# Patient Record
Sex: FEMALE | Race: WHITE | NOT HISPANIC OR LATINO | URBAN - METROPOLITAN AREA
[De-identification: names, ages, dates, MRNs, and addresses within clinical notes are randomized per-mention and may not be internally consistent; named-entity substitution may affect disease eponyms.]

---

## 2022-05-19 ENCOUNTER — OFFICE (OUTPATIENT)
Dept: URBAN - METROPOLITAN AREA CLINIC 14 | Facility: CLINIC | Age: 32
End: 2022-05-19

## 2022-05-19 VITALS
WEIGHT: 117 LBS | SYSTOLIC BLOOD PRESSURE: 113 MMHG | DIASTOLIC BLOOD PRESSURE: 78 MMHG | RESPIRATION RATE: 18 BRPM | HEART RATE: 67 BPM | HEIGHT: 66 IN | OXYGEN SATURATION: 99 %

## 2022-05-19 DIAGNOSIS — K62.5 HEMORRHAGE OF ANUS AND RECTUM: ICD-10-CM

## 2022-05-19 DIAGNOSIS — K62.89 OTHER SPECIFIED DISEASES OF ANUS AND RECTUM: ICD-10-CM

## 2022-05-19 LAB
CBC, PLATELET, NO DIFFERENTIAL: HEMATOCRIT: 39.8 % (ref 34–46.6)
CBC, PLATELET, NO DIFFERENTIAL: HEMOGLOBIN: 13 G/DL (ref 11.1–15.9)
CBC, PLATELET, NO DIFFERENTIAL: MCH: 30.6 PG (ref 26.6–33)
CBC, PLATELET, NO DIFFERENTIAL: MCHC: 32.7 G/DL (ref 31.5–35.7)
CBC, PLATELET, NO DIFFERENTIAL: MCV: 94 FL (ref 79–97)
CBC, PLATELET, NO DIFFERENTIAL: NRBC: (no result)
CBC, PLATELET, NO DIFFERENTIAL: PLATELETS: 308 X10E3/UL (ref 150–450)
CBC, PLATELET, NO DIFFERENTIAL: RBC: 4.25 X10E6/UL (ref 3.77–5.28)
CBC, PLATELET, NO DIFFERENTIAL: RDW: 12 % (ref 11.7–15.4)
CBC, PLATELET, NO DIFFERENTIAL: WBC: 10.4 X10E3/UL (ref 3.4–10.8)

## 2022-05-19 PROCEDURE — 99204 OFFICE O/P NEW MOD 45 MIN: CPT | Performed by: INTERNAL MEDICINE

## 2022-05-19 NOTE — SERVICEHPINOTES
Ms. Sherman is a 32-year-old female who is related to Yaquelin Lyle who is here for evaluation of rectal bleeding and anal pain. The patient states that she has had issues off and on for five years. She states that when she has bowel movement she will have a stinging burning pain intermittently. This has become more frequent over the past several weeks.  she also will have occasional bright red blood per rectum intermittently throughout the year as well. With the past two weeks she has had bright red blood per rectum with every bowel movement and also with urination episodes. She describes the pain as a sharp and burning pain. She does notice a protuberance of her anal canal. She does not take any NSAIDs. She usually has one soft bowel movement a day and denies any straining. She denies any and rectal trauma. There is no first-degree family history of colon cancer or colon polyps. She does have a cousin with inflammatory bowel disease. She has not had any kids. Her weight is stable. She denies any fevers or chills.

## 2022-05-19 NOTE — SERVICENOTES
Her symptoms do appear most consistent with anal fissure but hemorrhoids, proctitis, distal polyp or mass, or also in the differential.  We discussed the possibility of full colonoscopy versus flexible sigmoidoscopy.  Given that most her symptoms appear in the anorectal area and her young age with no family history it is reasonable to proceed with flexible sigmoidoscopy.  If the above workup does not work we can consider trial of nifedipine gel pending negative evaluation.  She was instructed to let us know if her symptoms do worsen the meantime.

## 2022-05-20 ENCOUNTER — AMBULATORY SURGICAL CENTER (OUTPATIENT)
Dept: URBAN - METROPOLITAN AREA SURGERY 3 | Facility: SURGERY | Age: 32
End: 2022-05-20

## 2022-05-20 VITALS
TEMPERATURE: 98.1 F | RESPIRATION RATE: 18 BRPM | DIASTOLIC BLOOD PRESSURE: 71 MMHG | HEIGHT: 66 IN | TEMPERATURE: 97.7 F | HEART RATE: 78 BPM | DIASTOLIC BLOOD PRESSURE: 75 MMHG | RESPIRATION RATE: 17 BRPM | SYSTOLIC BLOOD PRESSURE: 115 MMHG | SYSTOLIC BLOOD PRESSURE: 114 MMHG | HEART RATE: 78 BPM | TEMPERATURE: 97.7 F | SYSTOLIC BLOOD PRESSURE: 114 MMHG | HEIGHT: 66 IN | HEART RATE: 72 BPM | SYSTOLIC BLOOD PRESSURE: 115 MMHG | TEMPERATURE: 98.1 F | RESPIRATION RATE: 18 BRPM | OXYGEN SATURATION: 100 % | WEIGHT: 117 LBS | DIASTOLIC BLOOD PRESSURE: 71 MMHG | DIASTOLIC BLOOD PRESSURE: 75 MMHG | HEART RATE: 72 BPM | WEIGHT: 117 LBS | OXYGEN SATURATION: 100 % | RESPIRATION RATE: 17 BRPM

## 2022-05-20 DIAGNOSIS — K64.0 FIRST DEGREE HEMORRHOIDS: ICD-10-CM

## 2022-05-20 DIAGNOSIS — K62.5 HEMORRHAGE OF ANUS AND RECTUM: ICD-10-CM

## 2022-05-20 PROBLEM — K62.89 OTHER SPECIFIED DISEASES OF ANUS AND RECTUM: Status: ACTIVE | Noted: 2022-05-20

## 2022-05-20 PROBLEM — K60.2 ANAL FISSURE, UNSPECIFIED: Status: ACTIVE | Noted: 2022-05-20

## 2022-05-20 PROCEDURE — 45330 DIAGNOSTIC SIGMOIDOSCOPY: CPT | Performed by: INTERNAL MEDICINE
